# Patient Record
Sex: FEMALE | Race: WHITE | URBAN - METROPOLITAN AREA
[De-identification: names, ages, dates, MRNs, and addresses within clinical notes are randomized per-mention and may not be internally consistent; named-entity substitution may affect disease eponyms.]

---

## 2019-07-18 ENCOUNTER — OFFICE VISIT (OUTPATIENT)
Dept: URGENT CARE | Facility: URGENT CARE | Age: 29
End: 2019-07-18
Payer: COMMERCIAL

## 2019-07-18 VITALS
HEART RATE: 72 BPM | OXYGEN SATURATION: 98 % | RESPIRATION RATE: 16 BRPM | DIASTOLIC BLOOD PRESSURE: 74 MMHG | SYSTOLIC BLOOD PRESSURE: 134 MMHG | WEIGHT: 121.4 LBS | TEMPERATURE: 98.2 F

## 2019-07-18 DIAGNOSIS — S29.012A UPPER BACK STRAIN, INITIAL ENCOUNTER: ICD-10-CM

## 2019-07-18 DIAGNOSIS — M54.9 UPPER BACK PAIN: Primary | ICD-10-CM

## 2019-07-18 PROCEDURE — 99203 OFFICE O/P NEW LOW 30 MIN: CPT | Performed by: FAMILY MEDICINE

## 2019-07-18 ASSESSMENT — PAIN SCALES - GENERAL: PAINLEVEL: EXTREME PAIN (8)

## 2019-07-18 NOTE — PROGRESS NOTES
SUBJECTIVE  HPI: Valarie Guzman is a 29 year old female who presents for evaluation of back pain  Symptoms began 1 day(s) ago, have been onset acute and are worse.  Pain is located in the middle of back bilateral region, with radiation to does not radiate,Recent injury:none recalled by the patient  Personal hx of back pain is no prior back problems.  Pain is exacerbated by: changing position.tuning head   Pain is relieved by: OTC NSAIDs[unfilled] sx include: none.  Red flag symptoms: negative.    History reviewed. No pertinent past medical history.  No current outpatient medications on file.     Social History     Tobacco Use     Smoking status: Never Smoker     Smokeless tobacco: Never Used   Substance Use Topics     Alcohol use: Not on file     History reviewed. No pertinent family history.    ROS:  10 point ROS of systems including Constitutional, Eyes, Respiratory, Cardiovascular, Gastroenterology, Genitourinary, Integumentary,Psychiatric were all negative except for pertinent positives noted in my HPI           OBJECTIVE:  /74   Pulse 72   Temp 98.2  F (36.8  C) (Oral)   Resp 16   Wt 55.1 kg (121 lb 6.4 oz)   SpO2 98%   Back examination: Back symmetric, no curvature. ROM normal. No CVA tenderness.  GENERAL APPEARANCE: healthy, alert and no distress  RESP:good effort no sob   CV: regular rates and rhythm  NEURO: Normal strength and tone with no weakness or sensory deficit noted, reflexes normal   SKIN: no suspicious lesions or rashes    ASSESSMENT/IMPRESSION:  Valarie was seen today for urgent care and back pain.    Diagnoses and all orders for this visit:    Upper back pain    Upper back strain, initial encounter          PLAN:1) PLEASE SEE ORDER SUMMARY  [unfilled]:      1.  Continue stretching and strengthening exercises.       2.  Continue prn heat or ice application.  Advised to do nsaids 600 mg with food for next 3 days   Follow up if  symptoms fail to improve or worsens   Pt understood and  agreed with plan     Tammie Finney MD